# Patient Record
Sex: MALE | Race: OTHER
[De-identification: names, ages, dates, MRNs, and addresses within clinical notes are randomized per-mention and may not be internally consistent; named-entity substitution may affect disease eponyms.]

---

## 2020-10-29 ENCOUNTER — HOSPITAL ENCOUNTER (EMERGENCY)
Dept: HOSPITAL 8 - ED | Age: 30
Discharge: LEFT BEFORE DISCHARGE | End: 2020-10-29
Payer: SELF-PAY

## 2020-10-29 VITALS — SYSTOLIC BLOOD PRESSURE: 142 MMHG | DIASTOLIC BLOOD PRESSURE: 86 MMHG

## 2020-10-29 VITALS — BODY MASS INDEX: 31.39 KG/M2 | HEIGHT: 71 IN | WEIGHT: 224.21 LBS

## 2020-10-29 DIAGNOSIS — M54.5: Primary | ICD-10-CM

## 2020-10-29 DIAGNOSIS — R94.31: ICD-10-CM

## 2020-10-29 PROCEDURE — 93005 ELECTROCARDIOGRAM TRACING: CPT

## 2020-10-29 PROCEDURE — 72110 X-RAY EXAM L-2 SPINE 4/>VWS: CPT

## 2020-10-29 PROCEDURE — 99283 EMERGENCY DEPT VISIT LOW MDM: CPT

## 2020-10-29 NOTE — NUR
-------------------------------------------------------------------------------

           *** Note undone in ED - 10/29/20 at 1520 by KULWANT ***           

-------------------------------------------------------------------------------

PT TO ROOM FROM [facility] AT THIS TIME.

## 2020-12-02 NOTE — NUR
----- Message from Kandis Levy MD sent at 12/2/2020  8:03 AM CST -----  Call patient:  Labs look fairly good. Sugar is slightly high at 104, and mild decrease in kidney function, likely worse due to fasting status. Recheck 1 year.   Cholesterol looks good, except low HDL. Continue simvastatin, exercise and low fat diet, recheck in 1 year.   PT NOT IN LOBBY AT THIS TIME.

## 2021-07-23 ENCOUNTER — OFFICE VISIT (OUTPATIENT)
Dept: URGENT CARE | Facility: PHYSICIAN GROUP | Age: 31
End: 2021-07-23

## 2021-07-23 VITALS
RESPIRATION RATE: 16 BRPM | DIASTOLIC BLOOD PRESSURE: 68 MMHG | HEIGHT: 68 IN | TEMPERATURE: 98 F | HEART RATE: 62 BPM | SYSTOLIC BLOOD PRESSURE: 108 MMHG | WEIGHT: 171.4 LBS | OXYGEN SATURATION: 98 % | BODY MASS INDEX: 25.98 KG/M2

## 2021-07-23 DIAGNOSIS — Z02.89 ENCOUNTER FOR EXAMINATION REQUIRED BY DEPARTMENT OF TRANSPORTATION (DOT): ICD-10-CM

## 2021-07-23 LAB
APPEARANCE UR: CLEAR
BILIRUB UR STRIP-MCNC: NORMAL MG/DL
COLOR UR AUTO: YELLOW
GLUCOSE UR STRIP.AUTO-MCNC: NORMAL MG/DL
KETONES UR STRIP.AUTO-MCNC: NORMAL MG/DL
LEUKOCYTE ESTERASE UR QL STRIP.AUTO: NORMAL
NITRITE UR QL STRIP.AUTO: NORMAL
PH UR STRIP.AUTO: 7 [PH] (ref 5–8)
PROT UR QL STRIP: NORMAL MG/DL
RBC UR QL AUTO: NORMAL
SP GR UR STRIP.AUTO: 1.02
UROBILINOGEN UR STRIP-MCNC: 0.2 MG/DL

## 2021-07-23 PROCEDURE — 7100 PR DOT PHYSICAL: Performed by: NURSE PRACTITIONER

## 2021-07-23 NOTE — PROGRESS NOTES
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 Pt denies taking any medication. Pt states they have been in good health with no infections or illnesses lately. PT denies significant PMH and PSH. Pt denies CP, SOB, NVD, paresthesias, headaches, dizziness, change in vision, hives, or joint pain. Pt states current pain is 0/10. The pt's medication list, problem list, and allergies have been evaluated and reviewed during today's visit.    See DOT physical form for ROS and PE     1. Encounter for examination required by Department of Transportation (DOT)         Please see scanned sports physical form.  Pt is cleared for driving at this time. He is cleared for two years  AVS with medical info given.  Pt was in full understanding and agreement with the plan.  Follow-up as needed if symptoms worsen or fail to improve.

## 2021-07-29 ENCOUNTER — HOSPITAL ENCOUNTER (OUTPATIENT)
Facility: MEDICAL CENTER | Age: 31
End: 2021-07-29
Attending: PHYSICIAN ASSISTANT
Payer: COMMERCIAL

## 2021-07-29 ENCOUNTER — OFFICE VISIT (OUTPATIENT)
Dept: URGENT CARE | Facility: PHYSICIAN GROUP | Age: 31
End: 2021-07-29
Payer: COMMERCIAL

## 2021-07-29 VITALS
DIASTOLIC BLOOD PRESSURE: 76 MMHG | HEIGHT: 68 IN | BODY MASS INDEX: 26.22 KG/M2 | RESPIRATION RATE: 16 BRPM | SYSTOLIC BLOOD PRESSURE: 114 MMHG | OXYGEN SATURATION: 98 % | TEMPERATURE: 97.1 F | HEART RATE: 61 BPM | WEIGHT: 173 LBS

## 2021-07-29 DIAGNOSIS — Z20.2 POSSIBLE EXPOSURE TO STD: ICD-10-CM

## 2021-07-29 PROCEDURE — 87591 N.GONORRHOEAE DNA AMP PROB: CPT

## 2021-07-29 PROCEDURE — 99213 OFFICE O/P EST LOW 20 MIN: CPT | Performed by: PHYSICIAN ASSISTANT

## 2021-07-29 PROCEDURE — 87491 CHLMYD TRACH DNA AMP PROBE: CPT

## 2021-07-29 ASSESSMENT — ENCOUNTER SYMPTOMS
VOMITING: 0
HEADACHES: 0
FEVER: 0
TINGLING: 0
DIZZINESS: 0
NAUSEA: 0
ABDOMINAL PAIN: 0
FLANK PAIN: 0
CHILLS: 0

## 2021-07-29 NOTE — PROGRESS NOTES
"Subjective:   Kwabena Garnica is a 31 y.o. male who presents for Exposure to STD (poss exposure )    HPI:  This is a very pleasant 31-year-old male presenting to the clinic with potential exposure to STD.  Patient recently had a new sexual partner.  He is unsure if she has an STD.  She states she was tested and came back negative.  Currently he is not experiencing any symptoms.  Denies any dysuria, penile discharge, lower abdominal plain, testicular pain, rashes or lesions.  He did not use protection.  Denies any history of STD.  Would like to have a urine test performed.      Review of Systems   Constitutional: Negative for chills, fever and malaise/fatigue.   Gastrointestinal: Negative for abdominal pain, nausea and vomiting.   Genitourinary: Negative for dysuria, flank pain, frequency, hematuria and urgency.        Denies any penile discharge rashes or lesions.  Denies any testicular pain.   Musculoskeletal: Negative for joint pain.   Skin: Negative for rash.   Neurological: Negative for dizziness, tingling and headaches.       Medications:    • This patient does not have an active medication from one of the medication groupers.    Allergies: Patient has no known allergies.    Problem List: Kwabena Garnica does not have a problem list on file.    Surgical History:  No past surgical history on file.    Past Social Hx: Kwabena Garnica  reports that he has been smoking. He has never used smokeless tobacco. He reports current alcohol use. He reports that he does not use drugs.     Past Family Hx:  Kwabena Garnica family history is not on file.     Problem list, medications, and allergies reviewed by myself today in Epic.     Objective:     /76 (BP Location: Right arm, Patient Position: Sitting, BP Cuff Size: Large adult)   Pulse 61   Temp 36.2 °C (97.1 °F) (Temporal)   Resp 16   Ht 1.715 m (5' 7.5\")   Wt 78.5 kg (173 lb)   SpO2 98%   BMI 26.70 kg/m²     Physical " Exam  Constitutional:       General: He is not in acute distress.     Appearance: Normal appearance. He is not ill-appearing, toxic-appearing or diaphoretic.   HENT:      Head: Normocephalic and atraumatic.   Eyes:      Conjunctiva/sclera: Conjunctivae normal.   Cardiovascular:      Rate and Rhythm: Normal rate and regular rhythm.      Pulses: Normal pulses.      Heart sounds: Normal heart sounds.   Pulmonary:      Effort: Pulmonary effort is normal.      Breath sounds: Normal breath sounds.   Abdominal:      General: Bowel sounds are normal.      Tenderness: There is no abdominal tenderness.   Skin:     General: Skin is warm.      Capillary Refill: Capillary refill takes less than 2 seconds.   Neurological:      Mental Status: He is alert.           Assessment/Plan:     Comments/MDM:     • Performed a GC chlamydia urine culture we will follow-up once results are available and begin treatment if necessary.  Safe sex practices discussed.  Call with any questions or concerns.     Diagnosis and associated orders:     1. Possible exposure to STD  Chlamydia/GC PCR Urine Or Swab              Differential diagnosis, natural history, supportive care, and indications for immediate follow-up discussed.    Advised the patient to follow-up with the primary care physician for recheck, reevaluation, and consideration of further management.    Please note that this dictation was created using voice recognition software. I have made reasonable attempt to correct obvious errors, but I expect that there are errors of grammar and possibly content that I did not discover before finalizing the note.    This note was electronically signed by DALLAS Cherry PA-C

## 2021-07-30 DIAGNOSIS — A74.9 CHLAMYDIA: ICD-10-CM

## 2021-07-30 LAB
C TRACH DNA SPEC QL NAA+PROBE: POSITIVE
N GONORRHOEA DNA SPEC QL NAA+PROBE: NEGATIVE
SPECIMEN SOURCE: ABNORMAL

## 2021-07-30 RX ORDER — AZITHROMYCIN 500 MG/1
1000 TABLET, FILM COATED ORAL ONCE
Qty: 2 TABLET | Refills: 0 | Status: SHIPPED | OUTPATIENT
Start: 2021-07-30 | End: 2021-07-30

## 2021-07-31 NOTE — PROGRESS NOTES
Called and spoke with patient about his positive chlamydia result.  We will send in a gram azithromycin to his pharmacy.  He is going to speak with his partner and have her present for testing and treatment.  Encouraged to call with any questions or concerns.

## 2021-09-02 ENCOUNTER — APPOINTMENT (OUTPATIENT)
Dept: MEDICAL GROUP | Facility: PHYSICIAN GROUP | Age: 31
End: 2021-09-02
Payer: COMMERCIAL

## 2021-09-15 ENCOUNTER — OFFICE VISIT (OUTPATIENT)
Dept: MEDICAL GROUP | Facility: PHYSICIAN GROUP | Age: 31
End: 2021-09-15
Payer: COMMERCIAL

## 2021-09-15 VITALS
SYSTOLIC BLOOD PRESSURE: 120 MMHG | BODY MASS INDEX: 28.45 KG/M2 | TEMPERATURE: 97.9 F | OXYGEN SATURATION: 96 % | HEIGHT: 66 IN | WEIGHT: 177 LBS | HEART RATE: 88 BPM | DIASTOLIC BLOOD PRESSURE: 70 MMHG

## 2021-09-15 DIAGNOSIS — Z11.3 SCREEN FOR STD (SEXUALLY TRANSMITTED DISEASE): ICD-10-CM

## 2021-09-15 DIAGNOSIS — Z76.89 ENCOUNTER TO ESTABLISH CARE WITH NEW DOCTOR: ICD-10-CM

## 2021-09-15 DIAGNOSIS — Z13.228 SCREENING FOR METABOLIC DISORDER: ICD-10-CM

## 2021-09-15 PROCEDURE — 99203 OFFICE O/P NEW LOW 30 MIN: CPT | Performed by: STUDENT IN AN ORGANIZED HEALTH CARE EDUCATION/TRAINING PROGRAM

## 2021-09-15 ASSESSMENT — PATIENT HEALTH QUESTIONNAIRE - PHQ9: CLINICAL INTERPRETATION OF PHQ2 SCORE: 0

## 2021-09-15 NOTE — PATIENT INSTRUCTIONS
Please try to stop smoking.     Please get the labs done in the next few days.  Please get them done while fasting (no food, coffee, or juices, for 8 hours - but water is ok).     Please return in a week to review labs and screenings.   Thank you.

## 2021-09-15 NOTE — ASSESSMENT & PLAN NOTE
Patient notes recent vacation in [place], and did not use a condom while there.  He previously had Chlamydia (already treated), so he is interested in getting prescreened, after this current activity.   No rashes, bumps, sores, or ulcerations.  - will get screening labs for STD's.

## 2021-09-15 NOTE — ASSESSMENT & PLAN NOTE
Patient denies current health issues.   Just to start getting screened for health concerns.   Interested in basic screening labs.   Also interested in screening for std's (see that section).   - get basic screening labs.

## 2021-09-15 NOTE — PROGRESS NOTES
"New to Provider  (and [facility])      Kwabena Garnica is a 31 y.o. male.    Reason for visit: New patient to establish      - no recent PCP.   Chief Complaint   Patient presents with   • Establish Care             Problems discussed this visit:    This note uses problem-based documentation.  Subjective HPI is included in Assessment & Plan, at bottom of note.   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        Family History   Problem Relation Age of Onset   • Diabetes Mother         prediabetes or DM ?       Social History     Tobacco Use   • Smoking status: Current Some Day Smoker   • Smokeless tobacco: Never Used   • Tobacco comment: rare / occasional cigarette   Substance Use Topics   • Alcohol use: Yes     Comment: a few beers on weekend       Current medications:  (including new changes):  No current outpatient medications on file.     No current facility-administered medications for this visit.       Allergies as of 09/15/2021   • (No Known Allergies)                    Review of Symptoms  (as noted elsewhere, and .....)    GEN/CONST:   Denies fever, chills, fatigue,    CARDIO:   Denies chest pain, palpitations, or edema.    RESP:   Denies shortness of breath, wheezing, or coughing.  GI:    Denies nausea, vomiting, diarrhea,     :   Denies dysuria, hematuria,     MSK:  Denies joint pains  or muscle aches.    NEURO:  Denies numbness or focal weakness.       PSYCH:  Denies anxiety, depression, or substance abuse        Physical Exam  /70   Pulse 88   Temp 36.6 °C (97.9 °F) (Temporal)   Ht 1.676 m (5' 6\")   Wt 80.3 kg (177 " lb)   SpO2 96%   BMI 28.57 kg/m²   General:  Alert and oriented, No apparent distress.  Neck: Supple. No lymphadenopathy noted. Thyroid not enlarged.   Lungs: Clear to auscultation bilaterally.  No wheezes or rales.     Cardiovascular: Regular rate and rhythm.  No murmurs, or gallops.  Abdomen:  Soft.  Non-distended.  Non-tender.     Extremities: No leg edema.  Good general motion of extremities.    Psychological: Appears to have normal mood and affect.        Labs:  No recent basic labs   (prior + for Chlamydia, a few months ago).     - new labs ordered.....                            Assessment & Plan  (with subjective history and orders):  Of note, the list below is not in a specific nor sortable order.      Problem List Items Addressed This Visit     Encounter to establish care with new doctor     Patient denies current health issues.   Just to start getting screened for health concerns.   Interested in basic screening labs.   Also interested in screening for std's (see that section).   - get basic screening labs.          Relevant Orders    CBC WITH DIFFERENTIAL    Comp Metabolic Panel    Lipid Profile    Screen for STD (sexually transmitted disease)     Patient notes recent vacation in [place], and did not use a condom while there.  He previously had Chlamydia (already treated), so he is interested in getting prescreened, after this current activity.   No rashes, bumps, sores, or ulcerations.  - will get screening labs for STD's.           Relevant Orders    CBC WITH DIFFERENTIAL    Chlamydia/GC PCR Urine Or Swab    HIV AG/AB COMBO ASSAY SCREENING    T.PALLIDUM AB EIA    HSV 1/2 IGG W/ TYPE SPECIFIC RFLX      Other Visit Diagnoses     Screening for metabolic disorder        Relevant Orders    CBC WITH DIFFERENTIAL    Comp Metabolic Panel    Lipid Profile        Of note, the above list is not in a specific nor sortable order.                  Diagnosis Table, with orders:  1. Encounter to establish care with new  doctor  CBC WITH DIFFERENTIAL    Comp Metabolic Panel    Lipid Profile   2. Screening for metabolic disorder  CBC WITH DIFFERENTIAL    Comp Metabolic Panel    Lipid Profile   3. Screen for STD (sexually transmitted disease)  CBC WITH DIFFERENTIAL    Chlamydia/GC PCR Urine Or Swab    HIV AG/AB COMBO ASSAY SCREENING    T.PALLIDUM AB EIA    HSV 1/2 IGG W/ TYPE SPECIFIC RFLX                 Follow-up:  1 week   (Labs and screenings).                  A computerized dictation system may have been used in this note.    Despite review, there may be some errors in spelling or grammar.    Isaias Yoo M.D.  9/15/2021

## 2021-09-16 ENCOUNTER — HOSPITAL ENCOUNTER (OUTPATIENT)
Dept: LAB | Facility: MEDICAL CENTER | Age: 31
End: 2021-09-16
Attending: STUDENT IN AN ORGANIZED HEALTH CARE EDUCATION/TRAINING PROGRAM
Payer: COMMERCIAL

## 2021-09-16 DIAGNOSIS — Z76.89 ENCOUNTER TO ESTABLISH CARE WITH NEW DOCTOR: ICD-10-CM

## 2021-09-16 DIAGNOSIS — Z11.3 SCREEN FOR STD (SEXUALLY TRANSMITTED DISEASE): ICD-10-CM

## 2021-09-16 DIAGNOSIS — Z13.228 SCREENING FOR METABOLIC DISORDER: ICD-10-CM

## 2021-09-16 LAB
ALBUMIN SERPL BCP-MCNC: 4.8 G/DL (ref 3.2–4.9)
ALBUMIN/GLOB SERPL: 1.5 G/DL
ALP SERPL-CCNC: 91 U/L (ref 30–99)
ALT SERPL-CCNC: 25 U/L (ref 2–50)
ANION GAP SERPL CALC-SCNC: 14 MMOL/L (ref 7–16)
AST SERPL-CCNC: 34 U/L (ref 12–45)
BASOPHILS # BLD AUTO: 0.6 % (ref 0–1.8)
BASOPHILS # BLD: 0.04 K/UL (ref 0–0.12)
BILIRUB SERPL-MCNC: 1.5 MG/DL (ref 0.1–1.5)
BUN SERPL-MCNC: 9 MG/DL (ref 8–22)
CALCIUM SERPL-MCNC: 9.8 MG/DL (ref 8.5–10.5)
CHLORIDE SERPL-SCNC: 96 MMOL/L (ref 96–112)
CHOLEST SERPL-MCNC: 225 MG/DL (ref 100–199)
CO2 SERPL-SCNC: 27 MMOL/L (ref 20–33)
CREAT SERPL-MCNC: 0.94 MG/DL (ref 0.5–1.4)
EOSINOPHIL # BLD AUTO: 0.16 K/UL (ref 0–0.51)
EOSINOPHIL NFR BLD: 2.4 % (ref 0–6.9)
ERYTHROCYTE [DISTWIDTH] IN BLOOD BY AUTOMATED COUNT: 42.9 FL (ref 35.9–50)
FASTING STATUS PATIENT QL REPORTED: NORMAL
GLOBULIN SER CALC-MCNC: 3.3 G/DL (ref 1.9–3.5)
GLUCOSE SERPL-MCNC: 87 MG/DL (ref 65–99)
HCT VFR BLD AUTO: 49.4 % (ref 42–52)
HDLC SERPL-MCNC: 88 MG/DL
HGB BLD-MCNC: 17.3 G/DL (ref 14–18)
HIV 1+2 AB+HIV1 P24 AG SERPL QL IA: NORMAL
IMM GRANULOCYTES # BLD AUTO: 0.05 K/UL (ref 0–0.11)
IMM GRANULOCYTES NFR BLD AUTO: 0.8 % (ref 0–0.9)
LDLC SERPL CALC-MCNC: 113 MG/DL
LYMPHOCYTES # BLD AUTO: 0.74 K/UL (ref 1–4.8)
LYMPHOCYTES NFR BLD: 11.2 % (ref 22–41)
MCH RBC QN AUTO: 31.7 PG (ref 27–33)
MCHC RBC AUTO-ENTMCNC: 35 G/DL (ref 33.7–35.3)
MCV RBC AUTO: 90.5 FL (ref 81.4–97.8)
MONOCYTES # BLD AUTO: 0.82 K/UL (ref 0–0.85)
MONOCYTES NFR BLD AUTO: 12.4 % (ref 0–13.4)
NEUTROPHILS # BLD AUTO: 4.81 K/UL (ref 1.82–7.42)
NEUTROPHILS NFR BLD: 72.6 % (ref 44–72)
NRBC # BLD AUTO: 0 K/UL
NRBC BLD-RTO: 0 /100 WBC
PLATELET # BLD AUTO: 245 K/UL (ref 164–446)
PMV BLD AUTO: 10.2 FL (ref 9–12.9)
POTASSIUM SERPL-SCNC: 3.8 MMOL/L (ref 3.6–5.5)
PROT SERPL-MCNC: 8.1 G/DL (ref 6–8.2)
RBC # BLD AUTO: 5.46 M/UL (ref 4.7–6.1)
SODIUM SERPL-SCNC: 137 MMOL/L (ref 135–145)
TREPONEMA PALLIDUM IGG+IGM AB [PRESENCE] IN SERUM OR PLASMA BY IMMUNOASSAY: NORMAL
TRIGL SERPL-MCNC: 119 MG/DL (ref 0–149)
WBC # BLD AUTO: 6.6 K/UL (ref 4.8–10.8)

## 2021-09-16 PROCEDURE — 80053 COMPREHEN METABOLIC PANEL: CPT

## 2021-09-16 PROCEDURE — 86780 TREPONEMA PALLIDUM: CPT

## 2021-09-16 PROCEDURE — 85025 COMPLETE CBC W/AUTO DIFF WBC: CPT

## 2021-09-16 PROCEDURE — 87591 N.GONORRHOEAE DNA AMP PROB: CPT

## 2021-09-16 PROCEDURE — 87491 CHLMYD TRACH DNA AMP PROBE: CPT

## 2021-09-16 PROCEDURE — 36415 COLL VENOUS BLD VENIPUNCTURE: CPT

## 2021-09-16 PROCEDURE — 86694 HERPES SIMPLEX NES ANTBDY: CPT

## 2021-09-16 PROCEDURE — 87389 HIV-1 AG W/HIV-1&-2 AB AG IA: CPT

## 2021-09-16 PROCEDURE — 86696 HERPES SIMPLEX TYPE 2 TEST: CPT

## 2021-09-16 PROCEDURE — 80061 LIPID PANEL: CPT

## 2021-09-16 PROCEDURE — 86695 HERPES SIMPLEX TYPE 1 TEST: CPT

## 2021-09-17 LAB
C TRACH DNA SPEC QL NAA+PROBE: NEGATIVE
N GONORRHOEA DNA SPEC QL NAA+PROBE: NEGATIVE
SPECIMEN SOURCE: NORMAL

## 2021-09-19 ENCOUNTER — TELEPHONE (OUTPATIENT)
Dept: MEDICAL GROUP | Facility: PHYSICIAN GROUP | Age: 31
End: 2021-09-19

## 2021-09-19 LAB
HSV1 GG IGG SER-ACNC: 48.4 IV
HSV1+2 IGG SER IA-ACNC: >22.4 IV
HSV2 GG IGG SER-ACNC: 0.14 IV

## 2021-09-19 NOTE — TELEPHONE ENCOUNTER
"Recent lab had incomplete testing for the HSV portion of the tests.….It had the preliminary screen for positive antibodies, but they did not do the reflex testing for their \"final result\" (although their lab notes mention adding on the extra codes for the reflex).…    I called the lab, and they said they would check on it; however, the person in charge is not there today.…  They will try and check on this, and get back to us within the next day or two  (to see if the test is still being done, or if it needs to be reordered)...    Isaias Yoo M.D., 9/19/2021     "

## 2021-09-20 ENCOUNTER — PATIENT MESSAGE (OUTPATIENT)
Dept: MEDICAL GROUP | Facility: PHYSICIAN GROUP | Age: 31
End: 2021-09-20

## 2021-09-20 NOTE — PROGRESS NOTES
Results finally returned for HSV screen… Previously testing had shown positive screen, but did not automatically reflex to type I or type II testing.  (Lab was contacted yesterday, and now apparently resolved)…  - Only type I present (for cold sores).  … Patient notified on MyChart.

## 2021-09-22 ENCOUNTER — OFFICE VISIT (OUTPATIENT)
Dept: MEDICAL GROUP | Facility: PHYSICIAN GROUP | Age: 31
End: 2021-09-22
Payer: COMMERCIAL

## 2021-09-22 VITALS
DIASTOLIC BLOOD PRESSURE: 72 MMHG | OXYGEN SATURATION: 99 % | BODY MASS INDEX: 26.67 KG/M2 | WEIGHT: 176 LBS | HEIGHT: 68 IN | SYSTOLIC BLOOD PRESSURE: 104 MMHG | HEART RATE: 63 BPM | TEMPERATURE: 97.7 F

## 2021-09-22 DIAGNOSIS — E78.00 PURE HYPERCHOLESTEROLEMIA: ICD-10-CM

## 2021-09-22 DIAGNOSIS — B00.1 COLD SORE: ICD-10-CM

## 2021-09-22 PROBLEM — Z76.89 ENCOUNTER TO ESTABLISH CARE WITH NEW DOCTOR: Status: RESOLVED | Noted: 2021-09-15 | Resolved: 2021-09-22

## 2021-09-22 PROBLEM — Z11.3 SCREEN FOR STD (SEXUALLY TRANSMITTED DISEASE): Status: RESOLVED | Noted: 2021-09-15 | Resolved: 2021-09-22

## 2021-09-22 PROCEDURE — 99213 OFFICE O/P EST LOW 20 MIN: CPT | Performed by: STUDENT IN AN ORGANIZED HEALTH CARE EDUCATION/TRAINING PROGRAM

## 2021-09-22 ASSESSMENT — FIBROSIS 4 INDEX: FIB4 SCORE: 0.89

## 2021-09-22 NOTE — ASSESSMENT & PLAN NOTE
Recent labs ... LDL - 113. (good HDL - 88).   Denies:  angina, palpitations, or dyspnea   Relatively young age, and good HDL.   - monitor periodically.   - no medications for now.

## 2021-09-22 NOTE — PROGRESS NOTES
"Established Patient     Kwabena Garnica is a 31 y.o. male.    Chief Complaint   Patient presents with   • Lab Results             Problems addressed this visit:     This note uses problem-based documentation.  Subjective HPI is included in Assessment & Plan, at bottom of note.   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        Family History   Problem Relation Age of Onset   • Diabetes Mother         prediabetes or DM ?       Social History     Tobacco Use   • Smoking status: Current Some Day Smoker   • Smokeless tobacco: Never Used   • Tobacco comment: rare / occasional cigarette   Substance Use Topics   • Alcohol use: Yes     Comment: a few beers on weekend       Current medications:  (including new changes):  No current outpatient medications on file.     No current facility-administered medications for this visit.       Allergies as of 09/22/2021   • (No Known Allergies)                   Review of Symptoms   (as noted elsewhere, and .....)   GEN/CONST:   Denies fever, chills,    CARDIO:   Denies chest pain, or edema.    RESP:   Denies shortness of breath, or coughing.  GI:    Denies nausea, vomiting, diarrhea,      PSYCH:  Denies anxiety, depression,           Physical Exam  /72 (BP Location: Left arm, Patient Position: Sitting, BP Cuff Size: Adult)   Pulse 63   Temp 36.5 °C (97.7 °F) (Temporal)   Ht 1.727 m (5' 8\")   Wt 79.8 kg (176 lb)   SpO2 99%   BMI 26.76 kg/m²   General:  Alert and " oriented, No apparent distress.   Neck: Trachea midline, no masses, no obvious thyromegaly.  Lungs: Easy / unlabored breathing, without difficulty.  Good oxygenation.   MSK:  Good general motion of extremities. Normal ambulation.    Psych: Appears to have normal mood and affect.         Labs:  Recent / Prior labs reviewed.    CBC, CMP, Lipids and std-screening                             Assessment & Plan  (with subjective history and orders):  Of note, the list below is not in a specific nor sortable order.      Problem List Items Addressed This Visit     Cold sore     Recent testing with HSV-1 only.   No recent outbreaks since childhood.  Discussed treatment, will hold-off until outbreak.   Discussed spread and precaution.   - no medications at this time.          Pure hypercholesterolemia     Recent labs ... LDL - 113. (good HDL - 88).   Denies:  angina, palpitations, or dyspnea   Relatively young age, and good HDL.   - monitor periodically.   - no medications for now.              Of note, the above list is not in a specific nor sortable order.                  Diagnosis Table, with orders:  1. Cold sore     2. Pure hypercholesterolemia                   Follow-up:  1 year, or as needed                A computerized dictation system may have been used in this note.    Despite review, there may be some errors in spelling or grammar.    Isaias Yoo M.D.  9/22/2021

## 2021-09-22 NOTE — ASSESSMENT & PLAN NOTE
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   No recent outbreaks since childhood.  Discussed treatment, will hold-off until outbreak.   Discussed spread and precaution.   - no medications at this time.

## 2021-10-06 ENCOUNTER — OFFICE VISIT (OUTPATIENT)
Dept: URGENT CARE | Facility: PHYSICIAN GROUP | Age: 31
End: 2021-10-06

## 2021-10-06 VITALS
HEART RATE: 58 BPM | BODY MASS INDEX: 27.47 KG/M2 | DIASTOLIC BLOOD PRESSURE: 80 MMHG | SYSTOLIC BLOOD PRESSURE: 118 MMHG | TEMPERATURE: 97.7 F | WEIGHT: 175 LBS | OXYGEN SATURATION: 100 % | RESPIRATION RATE: 14 BRPM | HEIGHT: 67 IN

## 2021-10-06 DIAGNOSIS — Z02.4 ENCOUNTER FOR DEPARTMENT OF TRANSPORTATION (DOT) EXAMINATION FOR DRIVING LICENSE RENEWAL: ICD-10-CM

## 2021-10-06 PROCEDURE — 7100 PR DOT PHYSICAL: Performed by: PHYSICIAN ASSISTANT

## 2021-10-06 ASSESSMENT — FIBROSIS 4 INDEX: FIB4 SCORE: 0.89

## 2021-10-06 NOTE — PROGRESS NOTES
"Subjective:   Kwabena Garnica  is a 31 y.o. male who presents for Employment Physical (DOT)        HPI   Mr. Garnica is a very pleasant 31-year-old male who presents to urgent care for DOT physical.  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  However, patient reports that he is in need of a new certification for new license.  Patient has never been denied certification and has never been certified for less than 2 years.  He does wear corrective lenses.  Review of Systems   All other systems reviewed and are negative.    No Known Allergies  Reviewed past medical, surgical , social and family history.  Reviewed prescription and over-the-counter medications with patient and electronic health record today.     Objective:   /80   Pulse (!) 58   Temp 36.5 °C (97.7 °F)   Resp 14   Ht 1.702 m (5' 7\")   Wt 79.4 kg (175 lb)   SpO2 100%   BMI 27.41 kg/m²   Physical Exam      Assessment/Plan:   1. Encounter for Department of Transportation (DOT) examination for driving license renewal     Patient is cleared for 2-year certification wearing corrective lenses.  See scanned document.  Return to clinic 2 years for repeat exam.    Please note that this note was created using voice recognition speech to text software. Every effort has been made to correct obvious errors.  However, I expect there are errors of grammar and possibly context that were not discovered prior to finalizing the note  JESSICA Singer PA-C  "

## 2021-11-14 ENCOUNTER — HOSPITAL ENCOUNTER (EMERGENCY)
Facility: MEDICAL CENTER | Age: 31
End: 2021-11-14
Attending: EMERGENCY MEDICINE
Payer: COMMERCIAL

## 2021-11-14 ENCOUNTER — APPOINTMENT (OUTPATIENT)
Dept: RADIOLOGY | Facility: MEDICAL CENTER | Age: 31
End: 2021-11-14
Attending: EMERGENCY MEDICINE
Payer: COMMERCIAL

## 2021-11-14 VITALS
WEIGHT: 200.62 LBS | DIASTOLIC BLOOD PRESSURE: 74 MMHG | OXYGEN SATURATION: 95 % | HEIGHT: 71 IN | RESPIRATION RATE: 18 BRPM | TEMPERATURE: 97.8 F | SYSTOLIC BLOOD PRESSURE: 122 MMHG | HEART RATE: 91 BPM | BODY MASS INDEX: 28.09 KG/M2

## 2021-11-14 DIAGNOSIS — G44.009 CLUSTER HEADACHE, NOT INTRACTABLE, UNSPECIFIED CHRONICITY PATTERN: ICD-10-CM

## 2021-11-14 DIAGNOSIS — M54.2 NECK PAIN: ICD-10-CM

## 2021-11-14 DIAGNOSIS — M54.9 PAIN, UPPER BACK: ICD-10-CM

## 2021-11-14 LAB
ALBUMIN SERPL BCP-MCNC: 4.6 G/DL (ref 3.2–4.9)
ALBUMIN/GLOB SERPL: 1.2 G/DL
ALP SERPL-CCNC: 116 U/L (ref 30–99)
ALT SERPL-CCNC: 52 U/L (ref 2–50)
ANION GAP SERPL CALC-SCNC: 11 MMOL/L (ref 7–16)
AST SERPL-CCNC: 21 U/L (ref 12–45)
BASOPHILS # BLD AUTO: 0.4 % (ref 0–1.8)
BASOPHILS # BLD: 0.05 K/UL (ref 0–0.12)
BILIRUB SERPL-MCNC: 0.4 MG/DL (ref 0.1–1.5)
BUN SERPL-MCNC: 7 MG/DL (ref 8–22)
CALCIUM SERPL-MCNC: 9.5 MG/DL (ref 8.4–10.2)
CHLORIDE SERPL-SCNC: 102 MMOL/L (ref 96–112)
CO2 SERPL-SCNC: 27 MMOL/L (ref 20–33)
CREAT SERPL-MCNC: 1.08 MG/DL (ref 0.5–1.4)
CRP SERPL HS-MCNC: <0.3 MG/DL (ref 0–0.75)
EKG IMPRESSION: NORMAL
EOSINOPHIL # BLD AUTO: 0.06 K/UL (ref 0–0.51)
EOSINOPHIL NFR BLD: 0.5 % (ref 0–6.9)
ERYTHROCYTE [DISTWIDTH] IN BLOOD BY AUTOMATED COUNT: 37.5 FL (ref 35.9–50)
ERYTHROCYTE [SEDIMENTATION RATE] IN BLOOD BY WESTERGREN METHOD: 6 MM/HOUR (ref 0–20)
GLOBULIN SER CALC-MCNC: 3.9 G/DL (ref 1.9–3.5)
GLUCOSE SERPL-MCNC: 90 MG/DL (ref 65–99)
HCT VFR BLD AUTO: 48.9 % (ref 42–52)
HGB BLD-MCNC: 16.9 G/DL (ref 14–18)
IMM GRANULOCYTES # BLD AUTO: 0.12 K/UL (ref 0–0.11)
IMM GRANULOCYTES NFR BLD AUTO: 1 % (ref 0–0.9)
LYMPHOCYTES # BLD AUTO: 3.44 K/UL (ref 1–4.8)
LYMPHOCYTES NFR BLD: 28 % (ref 22–41)
MCH RBC QN AUTO: 29.1 PG (ref 27–33)
MCHC RBC AUTO-ENTMCNC: 34.6 G/DL (ref 33.7–35.3)
MCV RBC AUTO: 84.2 FL (ref 81.4–97.8)
MONOCYTES # BLD AUTO: 0.78 K/UL (ref 0–0.85)
MONOCYTES NFR BLD AUTO: 6.4 % (ref 0–13.4)
NEUTROPHILS # BLD AUTO: 7.83 K/UL (ref 1.82–7.42)
NEUTROPHILS NFR BLD: 63.7 % (ref 44–72)
NRBC # BLD AUTO: 0 K/UL
NRBC BLD-RTO: 0 /100 WBC
PLATELET # BLD AUTO: 290 K/UL (ref 164–446)
PMV BLD AUTO: 10.2 FL (ref 9–12.9)
POTASSIUM SERPL-SCNC: 3.5 MMOL/L (ref 3.6–5.5)
PROT SERPL-MCNC: 8.5 G/DL (ref 6–8.2)
RBC # BLD AUTO: 5.81 M/UL (ref 4.7–6.1)
SODIUM SERPL-SCNC: 140 MMOL/L (ref 135–145)
TROPONIN T SERPL-MCNC: <6 NG/L (ref 6–19)
TSH SERPL DL<=0.005 MIU/L-ACNC: 1.38 UIU/ML (ref 0.38–5.33)
WBC # BLD AUTO: 12.3 K/UL (ref 4.8–10.8)

## 2021-11-14 PROCEDURE — 71045 X-RAY EXAM CHEST 1 VIEW: CPT

## 2021-11-14 PROCEDURE — 85025 COMPLETE CBC W/AUTO DIFF WBC: CPT

## 2021-11-14 PROCEDURE — 99284 EMERGENCY DEPT VISIT MOD MDM: CPT

## 2021-11-14 PROCEDURE — 72100 X-RAY EXAM L-S SPINE 2/3 VWS: CPT

## 2021-11-14 PROCEDURE — 84443 ASSAY THYROID STIM HORMONE: CPT

## 2021-11-14 PROCEDURE — 84484 ASSAY OF TROPONIN QUANT: CPT

## 2021-11-14 PROCEDURE — 72040 X-RAY EXAM NECK SPINE 2-3 VW: CPT

## 2021-11-14 PROCEDURE — 85652 RBC SED RATE AUTOMATED: CPT

## 2021-11-14 PROCEDURE — 80053 COMPREHEN METABOLIC PANEL: CPT

## 2021-11-14 PROCEDURE — 93005 ELECTROCARDIOGRAM TRACING: CPT | Performed by: EMERGENCY MEDICINE

## 2021-11-14 PROCEDURE — 70450 CT HEAD/BRAIN W/O DYE: CPT

## 2021-11-14 PROCEDURE — 72070 X-RAY EXAM THORAC SPINE 2VWS: CPT

## 2021-11-14 PROCEDURE — 86140 C-REACTIVE PROTEIN: CPT

## 2021-11-14 ASSESSMENT — FIBROSIS 4 INDEX: FIB4 SCORE: 0.89

## 2021-11-14 NOTE — Clinical Note
Kwabena Garnica was seen and treated in our emergency department on 11/14/2021.  He may return to work on 11/17/2021.       If you have any questions or concerns, please don't hesitate to call.      Gloria Vaz M.D.

## 2021-11-15 NOTE — ED TRIAGE NOTES
30 yo male presents to triage with reports of neck pain, upper back pain between shoulder blades and some associated dizziness with movement.  Patient reports he also feels like his arms are weak and when he lifts them he gets shakey.      Denies injury symptoms have been going on for a couple of weeks.    Patient is Covid Vaccinated

## 2021-11-15 NOTE — ED PROVIDER NOTES
"ED Provider Note    Scribed for Gloria Vaz M.D. by Veda Rose. 11/14/2021  9:37 PM    Primary care provider: Isaias Yoo M.D.  Means of arrival: walk-in  History obtained from: patient  History limited by: none    CHIEF COMPLAINT  Chief Complaint   Patient presents with    Neck Pain      for a couple of weeks     Back Pain     between shoulder blades     Weakness     when lifitng and moving arms     Dizziness       HPI  Kwabena Garnica is a 31 y.o. male who presents to the Emergency Department for evaluation of neck and upper back pain as well as intermittent sharp stabbing headaches onset 2 weeks. The patient notes he has pain on is left sided of his neck that radiates to his forehead. He notes that the pain is sharp and he will sometimes get dizzy with blurred vision. When he then looks side to side he notes he has associated eye pain. A few weeks ago he went to his primary care doctor who noted he had swelling in a lymph node on the left side of his neck. He was given antibiotics with no alleviation of any of his symptoms.    He also notes that when he lifts up his arms he gets super weak and fatigue and have associated chest pain and back pain between his shoulder blades that radiates up to his neck. He gets heart palpations and says based on his apple watch his heart rate increase to about 168 bpm for a few minutes. After each episode he says he feels mildly short of breath but notes that might be \"just in his head\" He denies swelling in legs, fevers, sore throat, nausea or vomiting. No alleviating factors were reported.  He denies smoking or drug use. He occasional drinks alcohol. He denies any falls or trauma.     REVIEW OF SYSTEMS  HEENT:  No sore throat   EYES: Blurred vision and eye pain  CARDIAC: chest pain, palpitations    PULMONARY: shortness of breath  GI: no nausea, no vomiting  Neuro: weakness, dizziness, fatigue  Musculoskeletal: Pain on left side of neck, Pain in between " "shoulder blades, No swelling in legs  Endocrine: no fevers    See history of present illness. All other systems are negative. C.    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    SURGICAL HISTORY   has a past surgical history that includes hip nailing intramedullary (Left, 2003).    SOCIAL HISTORY  Social History     Tobacco Use    Smoking status: Former Smoker    Smokeless tobacco: Never Used    Tobacco comment: rare / occasional cigarette   Vaping Use    Vaping Use: Former   Substance Use Topics    Alcohol use: Yes     Comment: a few beers on weekend    Drug use: Not Currently      Social History     Substance and Sexual Activity   Drug Use Not Currently       FAMILY HISTORY  Family History   Problem Relation Age of Onset    Diabetes Mother         prediabetes or DM ?       CURRENT MEDICATIONS  Home Medications       Reviewed by [place] Mauricio R.N. (Registered Nurse) on 11/14/21 at 2120  Med List Status: Complete     Medication Last Dose Status   loratadine/pseudo SR (CLARITIN D) 5-120 MG TABLET SR 12 HR 11/14/2021 Active                    ALLERGIES  No Known Allergies    PHYSICAL EXAM  VITAL SIGNS: /97   Pulse (!) 103   Temp 36.3 °C (97.3 °F) (Temporal)   Resp 16   Ht 1.803 m (5' 11\")   Wt 91 kg (200 lb 9.9 oz)   SpO2 96%   BMI 27.98 kg/m²     Constitutional: Well developed, Well nourished, No acute distress, Non-toxic appearance.   HEENT: Normocephalic, Atraumatic,  external ears normal, pharynx pink,  Mucous  Membranes moist, No rhinorrhea or mucosal edema  Eyes: PERRL, EOMI, Conjunctiva normal, No discharge.   Neck: Normal range of motion, No tenderness, Supple, No stridor.   Lymphatic: No lymphadenopathy    Cardiovascular: Regular Rate and Rhythm, No murmurs,  rubs, or gallops.   Thorax & Lungs: Lungs clear to auscultation bilaterally, No respiratory distress, No wheezes, rhales or rhonchi, No chest wall tenderness.   Abdomen: Bowel " sounds normal, Soft, non tender, non distended,  No pulsatile masses., no rebound guarding or peritoneal signs.   Skin: Warm, Dry, No erythema, No rash,   Back:  No CVA tenderness,  No spinal tenderness, bony crepitance, step offs, or instability.   Neurologic: Alert & oriented clear speech no focal deficits, NIH 0  Extremities: Equal, intact distal pulses, No cyanosis, clubbing or edema,  No tenderness.   Musculoskeletal: Good range of motion in all major joints. No tenderness to palpation or major deformities noted.     DIAGNOSTIC STUDIES / PROCEDURES    LABS  Results for orders placed or performed during the hospital encounter of 11/14/21   Sed Rate   Result Value Ref Range    Sed Rate Westergren 6 0 - 20 mm/hour   CRP QUANTITIVE (NON-CARDIAC)   Result Value Ref Range    Stat C-Reactive Protein <0.30 0.00 - 0.75 mg/dL   CBC WITH DIFFERENTIAL   Result Value Ref Range    WBC 12.3 (H) 4.8 - 10.8 K/uL    RBC 5.81 4.70 - 6.10 M/uL    Hemoglobin 16.9 14.0 - 18.0 g/dL    Hematocrit 48.9 42.0 - 52.0 %    MCV 84.2 81.4 - 97.8 fL    MCH 29.1 27.0 - 33.0 pg    MCHC 34.6 33.7 - 35.3 g/dL    RDW 37.5 35.9 - 50.0 fL    Platelet Count 290 164 - 446 K/uL    MPV 10.2 9.0 - 12.9 fL    Neutrophils-Polys 63.70 44.00 - 72.00 %    Lymphocytes 28.00 22.00 - 41.00 %    Monocytes 6.40 0.00 - 13.40 %    Eosinophils 0.50 0.00 - 6.90 %    Basophils 0.40 0.00 - 1.80 %    Immature Granulocytes 1.00 (H) 0.00 - 0.90 %    Nucleated RBC 0.00 /100 WBC    Neutrophils (Absolute) 7.83 (H) 1.82 - 7.42 K/uL    Lymphs (Absolute) 3.44 1.00 - 4.80 K/uL    Monos (Absolute) 0.78 0.00 - 0.85 K/uL    Eos (Absolute) 0.06 0.00 - 0.51 K/uL    Baso (Absolute) 0.05 0.00 - 0.12 K/uL    Immature Granulocytes (abs) 0.12 (H) 0.00 - 0.11 K/uL    NRBC (Absolute) 0.00 K/uL   COMP METABOLIC PANEL   Result Value Ref Range    Sodium 140 135 - 145 mmol/L    Potassium 3.5 (L) 3.6 - 5.5 mmol/L    Chloride 102 96 - 112 mmol/L    Co2 27 20 - 33 mmol/L    Anion Gap 11.0 7.0 -  16.0    Glucose 90 65 - 99 mg/dL    Bun 7 (L) 8 - 22 mg/dL    Creatinine 1.08 0.50 - 1.40 mg/dL    Calcium 9.5 8.4 - 10.2 mg/dL    AST(SGOT) 21 12 - 45 U/L    ALT(SGPT) 52 (H) 2 - 50 U/L    Alkaline Phosphatase 116 (H) 30 - 99 U/L    Total Bilirubin 0.4 0.1 - 1.5 mg/dL    Albumin 4.6 3.2 - 4.9 g/dL    Total Protein 8.5 (H) 6.0 - 8.2 g/dL    Globulin 3.9 (H) 1.9 - 3.5 g/dL    A-G Ratio 1.2 g/dL   TROPONIN   Result Value Ref Range    Troponin T <6 6 - 19 ng/L   TSH WITH REFLEX TO FT4   Result Value Ref Range    TSH 1.380 0.380 - 5.330 uIU/mL   ESTIMATED GFR   Result Value Ref Range    GFR If African American >60 >60 mL/min/1.73 m 2    GFR If Non African American >60 >60 mL/min/1.73 m 2       All labs reviewed by me.    EKG  12 lead EKG; Interpreted by emergency department physician    RADIOLOGY  CT-HEAD W/O   Final Result         1.  No acute intracranial abnormality.   2.  Chronic appearing left maxillary sinusitis changes      DX-THORACIC SPINE-2 VIEWS   Final Result         1.  No acute traumatic bony injury of the thoracic spine.      DX-CERVICAL SPINE-2 OR 3 VIEWS   Final Result         1.  No acute traumatic bony injury of the cervical spine is appreciated.      DX-CHEST-PORTABLE (1 VIEW)   Final Result         1.  No acute cardiopulmonary disease.      DX-LUMBAR SPINE-2 OR 3 VIEWS   Final Result         1.  No acute traumatic bony injury of the lumbar spine.        The radiologist's interpretation of all radiological studies have been reviewed by me.    COURSE & MEDICAL DECISION MAKING  Nursing notes, VS, PMSFHx reviewed in chart.    9:37 PM Patient seen and examined at bedside. Ordered DX-Chest, CT-Head, DX-Lumbar, DX-TSpine, DX-Cspine, EKG, TSH with reflex to FT4, Sed Rate, CRP Garret, CBC with diff, CMP, Troponin to evaluate his symptoms. The differential diagnoses include but are not limited to: Neck srain, scoliosis, nerve compression, Thoracic outlet syndorme, migraine, cluster headaches, diabetes,  moises    10:56 PM Patient was reevaluated at bedside. Discussed lab and radiology results with the patient and informed them that all this test came back normal. I informed the patient that he should follow-up with his primary care doctor. I addressed the patient's concerns at this time.  I recommended that he avoids caffeine and take tylenol or motrin for his pain. I discussed plans for discharge. Patient verbalizes understanding and agreement to this plan of care.    The patient will return for new or worsening symptoms and is stable at the time of discharge.    The patient is referred to a primary physician for blood pressure management, diabetic screening, and for all other preventative health concerns.    DISPOSITION:  Patient will be discharged home in stable condition.    FOLLOW UP:  Isaias Yoo M.D.  [address] [place] [address] [id number] [phone]      for recheck      FINAL IMPRESSION  1. Neck pain    2. Pain, upper back    3. Cluster headache, not intractable, unspecified chronicity pattern          IVeda (Eun), am scribing for, and in the presence of, Gloria Vaz M.D..    Electronically signed by: Veda Rose (Eun), 11/14/2021    IGloria M.D. personally performed the services described in this documentation, as scribed by Veda Rose in my presence, and it is both accurate and complete.    The note accurately reflects work and decisions made by me.  Gloria Vaz M.D.  11/14/2021  11:12 PM